# Patient Record
Sex: MALE | Race: WHITE | Employment: FULL TIME | URBAN - METROPOLITAN AREA
[De-identification: names, ages, dates, MRNs, and addresses within clinical notes are randomized per-mention and may not be internally consistent; named-entity substitution may affect disease eponyms.]

---

## 2018-06-06 RX ORDER — LOSARTAN POTASSIUM AND HYDROCHLOROTHIAZIDE 25; 100 MG/1; MG/1
TABLET ORAL
Refills: 0 | COMMUNITY
Start: 2018-04-12 | End: 2022-04-27 | Stop reason: ALTCHOICE

## 2018-06-06 RX ORDER — ERGOCALCIFEROL 1.25 MG/1
CAPSULE ORAL
Refills: 0 | COMMUNITY
Start: 2018-05-09 | End: 2021-11-10

## 2018-06-06 NOTE — PROGRESS NOTES
[facility] PRE-ADMISSION TESTING INSTRUCTIONS    The Preadmission Testing patient is instructed accordingly using the following criteria (check applicable):    ARRIVAL INSTRUCTIONS:  [x] Parking the day of Surgery is located in the Main Entrance lot. Upon entering the door, make an immediate right to the surgery reception desk    [x] Complimentary [address] is available Monday through Friday 6 am to 6 pm    [x] Bring photo ID and insurance card    [x] Bring in a copy of Living will or Durable Power of  papers. [x] Please be sure to arrange transportation to and from the hospital    [x] Please arrange for someone to be with you for the 24 hour period post procedure due to having anesthesia      GENERAL INSTRUCTIONS:    [x] Nothing by mouth after midnight, including gum, candy, mints or water    [x] You may brush your teeth, but do not swallow any water    [] Take medications as instructed with 1-2 oz of water    [] Stop herbal supplements and vitamins 5 days prior to procedure    [] Follow preop dosing of blood thinners per physician instructions    [] Do not take insulin or oral diabetic medications    [] If diabetic and have low blood sugar or feel symptomatic, take 1-2oz apple juice or glucose tablets    [] Bring inhalers day of surgery    [] Bring C-PAP/ Bi-Pap day of surgery    [] Bring urine specimen day of surgery    [] Shower or bath with soap, lather and rinse well, AM of Surgery, no lotion, powders or creams to surgical site    [x] Follow bowel prep as instructed per surgeon    [x] No tobacco products within 24 hours of surgery     [x] No alcohol or illegal drug use within 24 hours of surgery.     [x] Jewelry, body piercing's, eyeglasses, contact lenses and dentures are not permitted into surgery (bring cases)      [] Please do not wear any nail polish, make up or hair products on the day of surgery    [x] If not already done, you can expect a call from

## 2018-06-06 NOTE — PROGRESS NOTES
Patient requests that Dr Solitario Wright speaks ONLY to the patient post procedure, no information is to be given to family members.

## 2018-06-14 ENCOUNTER — HOSPITAL ENCOUNTER (OUTPATIENT)
Age: 53
Setting detail: OUTPATIENT SURGERY
Discharge: HOME | End: 2018-06-14
Attending: INTERNAL MEDICINE | Admitting: INTERNAL MEDICINE
Payer: COMMERCIAL

## 2018-06-14 ENCOUNTER — ANESTHESIA EVENT (OUTPATIENT)
Dept: ENDOSCOPY | Age: 53
End: 2018-06-14
Payer: COMMERCIAL

## 2018-06-14 ENCOUNTER — ANESTHESIA (OUTPATIENT)
Dept: ENDOSCOPY | Age: 53
End: 2018-06-14
Payer: COMMERCIAL

## 2018-06-14 VITALS
BODY MASS INDEX: 35.76 KG/M2 | HEIGHT: 72 IN | TEMPERATURE: 98 F | OXYGEN SATURATION: 99 % | SYSTOLIC BLOOD PRESSURE: 118 MMHG | DIASTOLIC BLOOD PRESSURE: 74 MMHG | HEART RATE: 58 BPM | RESPIRATION RATE: 16 BRPM | WEIGHT: 264 LBS

## 2018-06-14 VITALS — SYSTOLIC BLOOD PRESSURE: 105 MMHG | DIASTOLIC BLOOD PRESSURE: 61 MMHG | OXYGEN SATURATION: 99 %

## 2018-06-14 PROCEDURE — 7100000011 HC PHASE II RECOVERY - ADDTL 15 MIN: Performed by: INTERNAL MEDICINE

## 2018-06-14 PROCEDURE — 88305 TISSUE EXAM BY PATHOLOGIST: CPT

## 2018-06-14 PROCEDURE — 7100000010 HC PHASE II RECOVERY - FIRST 15 MIN: Performed by: INTERNAL MEDICINE

## 2018-06-14 PROCEDURE — 6360000002 HC RX W HCPCS: Performed by: NURSE ANESTHETIST, CERTIFIED REGISTERED

## 2018-06-14 PROCEDURE — C1773 RET DEV, INSERTABLE: HCPCS | Performed by: INTERNAL MEDICINE

## 2018-06-14 PROCEDURE — 3609010300 HC COLONOSCOPY W/BIOPSY SINGLE/MULTIPLE: Performed by: INTERNAL MEDICINE

## 2018-06-14 PROCEDURE — 3700000000 HC ANESTHESIA ATTENDED CARE: Performed by: INTERNAL MEDICINE

## 2018-06-14 PROCEDURE — 2580000003 HC RX 258: Performed by: NURSE ANESTHETIST, CERTIFIED REGISTERED

## 2018-06-14 PROCEDURE — 3700000001 HC ADD 15 MINUTES (ANESTHESIA): Performed by: INTERNAL MEDICINE

## 2018-06-14 RX ORDER — 0.9 % SODIUM CHLORIDE 0.9 %
10 VIAL (ML) INJECTION PRN
Status: CANCELLED | OUTPATIENT
Start: 2018-06-14

## 2018-06-14 RX ORDER — 0.9 % SODIUM CHLORIDE 0.9 %
10 VIAL (ML) INJECTION EVERY 12 HOURS SCHEDULED
Status: CANCELLED | OUTPATIENT
Start: 2018-06-14

## 2018-06-14 RX ORDER — PROPOFOL 10 MG/ML
INJECTION, EMULSION INTRAVENOUS PRN
Status: DISCONTINUED | OUTPATIENT
Start: 2018-06-14 | End: 2018-06-14 | Stop reason: SDUPTHER

## 2018-06-14 RX ORDER — SODIUM CHLORIDE 9 MG/ML
INJECTION, SOLUTION INTRAVENOUS CONTINUOUS PRN
Status: DISCONTINUED | OUTPATIENT
Start: 2018-06-14 | End: 2018-06-14 | Stop reason: SDUPTHER

## 2018-06-14 RX ADMIN — PROPOFOL 300 MG: 10 INJECTION, EMULSION INTRAVENOUS at 14:19

## 2018-06-14 RX ADMIN — SODIUM CHLORIDE: 9 INJECTION, SOLUTION INTRAVENOUS at 14:03

## 2018-06-14 ASSESSMENT — PAIN SCALES - GENERAL: PAINLEVEL_OUTOF10: 0

## 2020-07-13 LAB
VITAMIN D 25-HYDROXY: 55
VITAMIN D2, 25 HYDROXY: NORMAL
VITAMIN D3,25 HYDROXY: NORMAL

## 2021-11-10 RX ORDER — AMLODIPINE BESYLATE 5 MG/1
5 TABLET ORAL DAILY
COMMUNITY
End: 2022-05-31

## 2021-11-10 NOTE — PROGRESS NOTES
Have you been tested for COVID  No           Have you been told you were positive for COVID No  Have you had any known exposure to someone that is positive for COVID No  Do you have a cough                   No              Do you have shortness of breath No                 Do you have a sore throat            No                Are you having chills                    No                Are you having muscle aches. No                    Please come to the hospital wearing a mask and have your significant other wear a mask as well. Both of you should check your temperature before leaving to come here,  if it is 100 or higher please call [phone] for instruction. [facility] PRE-ADMISSION TESTING INSTRUCTIONS      ARRIVAL INSTRUCTIONS:  [x] Parking the day of Surgery is located in the Main Entrance lot. Upon entering the main door make an immediate right to the surgery reception desk. [x] Bring photo ID and insurance card    [x] Bring in a copy of Living will or Durable Power of  papers. [x] Please be sure to arrange for responsible adult to provide transportation to and from the hospital    [x] Please arrange for responsible adult to be with you for the 24 hour period post procedure due to having anesthesia      GENERAL INSTRUCTIONS:    [x] Nothing by mouth after midnight, including gum, candy, mints or water    [x] You may brush your teeth, but do not swallow any water    [x] Take medications as instructed with 1-2 oz of water    [x] Follow bowel prep as instructed per surgeon    [x] No tobacco products within 24 hours of surgery     [x] No alcohol or illegal drug use within 24 hours of surgery.     [x] Jewelry, body piercing's, eyeglasses, contact lenses and dentures are not permitted into surgery (bring cases)    [x] If you receive a survey after surgery we would greatly appreciate your comments

## 2021-11-11 ENCOUNTER — HOSPITAL ENCOUNTER (OUTPATIENT)
Age: 56
Setting detail: OUTPATIENT SURGERY
Discharge: HOME | End: 2021-11-11
Attending: INTERNAL MEDICINE | Admitting: INTERNAL MEDICINE
Payer: COMMERCIAL

## 2021-11-11 ENCOUNTER — ANESTHESIA EVENT (OUTPATIENT)
Dept: ENDOSCOPY | Age: 56
End: 2021-11-11
Payer: COMMERCIAL

## 2021-11-11 ENCOUNTER — ANESTHESIA (OUTPATIENT)
Dept: ENDOSCOPY | Age: 56
End: 2021-11-11
Payer: COMMERCIAL

## 2021-11-11 VITALS
HEART RATE: 56 BPM | TEMPERATURE: 97.3 F | OXYGEN SATURATION: 99 % | RESPIRATION RATE: 18 BRPM | HEIGHT: 72 IN | DIASTOLIC BLOOD PRESSURE: 79 MMHG | SYSTOLIC BLOOD PRESSURE: 130 MMHG | WEIGHT: 268 LBS | BODY MASS INDEX: 36.3 KG/M2

## 2021-11-11 VITALS
SYSTOLIC BLOOD PRESSURE: 133 MMHG | DIASTOLIC BLOOD PRESSURE: 74 MMHG | RESPIRATION RATE: 21 BRPM | OXYGEN SATURATION: 98 %

## 2021-11-11 PROCEDURE — 2709999900 HC NON-CHARGEABLE SUPPLY: Performed by: INTERNAL MEDICINE

## 2021-11-11 PROCEDURE — 3700000000 HC ANESTHESIA ATTENDED CARE: Performed by: INTERNAL MEDICINE

## 2021-11-11 PROCEDURE — 7100000010 HC PHASE II RECOVERY - FIRST 15 MIN: Performed by: INTERNAL MEDICINE

## 2021-11-11 PROCEDURE — 2580000003 HC RX 258: Performed by: NURSE ANESTHETIST, CERTIFIED REGISTERED

## 2021-11-11 PROCEDURE — 7100000011 HC PHASE II RECOVERY - ADDTL 15 MIN: Performed by: INTERNAL MEDICINE

## 2021-11-11 PROCEDURE — 6360000002 HC RX W HCPCS: Performed by: NURSE ANESTHETIST, CERTIFIED REGISTERED

## 2021-11-11 PROCEDURE — 3700000001 HC ADD 15 MINUTES (ANESTHESIA): Performed by: INTERNAL MEDICINE

## 2021-11-11 PROCEDURE — 3609010300 HC COLONOSCOPY W/BIOPSY SINGLE/MULTIPLE: Performed by: INTERNAL MEDICINE

## 2021-11-11 PROCEDURE — 88305 TISSUE EXAM BY PATHOLOGIST: CPT

## 2021-11-11 RX ORDER — PROPOFOL 10 MG/ML
INJECTION, EMULSION INTRAVENOUS PRN
Status: DISCONTINUED | OUTPATIENT
Start: 2021-11-11 | End: 2021-11-11 | Stop reason: SDUPTHER

## 2021-11-11 RX ORDER — SODIUM CHLORIDE 9 MG/ML
INJECTION, SOLUTION INTRAVENOUS CONTINUOUS PRN
Status: DISCONTINUED | OUTPATIENT
Start: 2021-11-11 | End: 2021-11-11 | Stop reason: SDUPTHER

## 2021-11-11 RX ORDER — SODIUM CHLORIDE 9 MG/ML
25 INJECTION, SOLUTION INTRAVENOUS PRN
Status: DISCONTINUED | OUTPATIENT
Start: 2021-11-11 | End: 2021-11-11 | Stop reason: HOSPADM

## 2021-11-11 RX ORDER — SODIUM CHLORIDE 0.9 % (FLUSH) 0.9 %
5-40 SYRINGE (ML) INJECTION PRN
Status: DISCONTINUED | OUTPATIENT
Start: 2021-11-11 | End: 2021-11-11 | Stop reason: HOSPADM

## 2021-11-11 RX ORDER — SODIUM CHLORIDE 0.9 % (FLUSH) 0.9 %
5-40 SYRINGE (ML) INJECTION EVERY 12 HOURS SCHEDULED
Status: DISCONTINUED | OUTPATIENT
Start: 2021-11-11 | End: 2021-11-11 | Stop reason: HOSPADM

## 2021-11-11 RX ADMIN — SODIUM CHLORIDE: 9 INJECTION, SOLUTION INTRAVENOUS at 14:10

## 2021-11-11 RX ADMIN — PROPOFOL 450 MG: 10 INJECTION, EMULSION INTRAVENOUS at 14:16

## 2021-11-11 ASSESSMENT — PAIN SCALES - GENERAL
PAINLEVEL_OUTOF10: 0
PAINLEVEL_OUTOF10: 0

## 2021-11-11 ASSESSMENT — PAIN DESCRIPTION - PAIN TYPE
TYPE: SURGICAL PAIN
TYPE: SURGICAL PAIN

## 2021-11-11 ASSESSMENT — LIFESTYLE VARIABLES: SMOKING_STATUS: 1

## 2021-11-11 ASSESSMENT — PAIN - FUNCTIONAL ASSESSMENT: PAIN_FUNCTIONAL_ASSESSMENT: 0-10

## 2021-11-11 NOTE — ANESTHESIA PRE PROCEDURE
Department of Anesthesiology  Preprocedure Note       Name:  Ira Calabrese. Age:  49 y.o.  :  1965                                          MRN:  [id number]         Date:  2021      Surgeon: Shai Valerio):  Rose Jacinto DO    Procedure: Procedure(s):  COLORECTAL CANCER SCREENING, NOT HIGH RISK    Medications prior to admission:   Prior to Admission medications    Medication Sig Start Date End Date Taking? Authorizing Provider   amLODIPine (NORVASC) 5 MG tablet Take 5 mg by mouth daily   Yes Historical Provider, MD   losartan-hydrochlorothiazide (HYZAAR) 100-25 MG per tablet TAKE 1 TABLET BY MOUTH EVERY DAY 18  Yes Historical Provider, MD       Current medications:    Current Facility-Administered Medications   Medication Dose Route Frequency Provider Last Rate Last Admin    sodium chloride flush 0.9 % injection 5-40 mL  5-40 mL IntraVENous 2 times per day Rose Jacinto DO        sodium chloride flush 0.9 % injection 5-40 mL  5-40 mL IntraVENous PRN Rose Jacinto DO        0.9 % sodium chloride infusion  25 mL IntraVENous PRN Rose Jacinto DO           Allergies: Allergies   Allergen Reactions    Demerol Hcl [Meperidine] Nausea And Vomiting       Problem List:  There is no problem list on file for this patient. Past Medical History:        Diagnosis Date    Encounter for screening colonoscopy     Hypertension     Vitamin D deficiency        Past Surgical History:        Procedure Laterality Date    COLONOSCOPY N/A 2018    COLONOSCOPY WITH BIOPSY performed by Rose Jacinto DO at [facility] years ago.        Social History:    Social History     Tobacco Use    Smoking status: Light Tobacco Smoker    Smokeless tobacco: Never Used    Tobacco comment: occasional cigar   Substance Use Topics    Alcohol use: Yes     Comment: occasional                                Ready to quit: Not Answered  Counseling given: Not Answered  Comment: occasional cigar      Vital Signs (Current):   Vitals:    11/10/21 0925 11/11/21 1353   BP:  (!) 159/79   Pulse:  78   Resp:  18   Temp:  98 °F (36.7 °C)   TempSrc:  Temporal   SpO2:  97%   Weight: 268 lb (121.6 kg) 268 lb (121.6 kg)   Height: 6' (1.829 m) 6' (1.829 m)                                              BP Readings from Last 3 Encounters:   11/11/21 (!) 159/79   06/14/18 118/74   06/14/18 105/61       NPO Status: Time of last liquid consumption: 2200                        Time of last solid consumption: 1130                        Date of last liquid consumption: 11/10/21                        Date of last solid food consumption: 11/10/21    BMI:   Wt Readings from Last 3 Encounters:   11/11/21 268 lb (121.6 kg)   06/06/18 264 lb (119.7 kg)     Body mass index is 36.35 kg/m². CBC:   Lab Results   Component Value Date    WBC 6.2 09/07/2021    RBC 4.72 09/07/2021    HGB 13.5 09/07/2021    HCT 42.8 09/07/2021    MCV 90.7 09/07/2021    RDW 14.5 09/07/2021     09/07/2021       CMP:   Lab Results   Component Value Date     09/07/2021    K 4.5 09/07/2021     09/07/2021    CO2 26 09/07/2021    BUN 15 09/07/2021    CREATININE 0.8 09/07/2021    GFRAA >60 09/07/2021    LABGLOM >60 09/07/2021    GLUCOSE 77 09/07/2021    GLUCOSE 94 10/11/2011    PROT 7.4 09/07/2021    CALCIUM 9.7 09/07/2021    BILITOT 0.4 09/07/2021    ALKPHOS 74 09/07/2021    AST 32 09/07/2021    ALT 20 09/07/2021       POC Tests: No results for input(s): POCGLU, POCNA, POCK, POCCL, POCBUN, POCHEMO, POCHCT in the last 72 hours. Coags: No results found for: PROTIME, INR, APTT    HCG (If Applicable): No results found for: PREGTESTUR, PREGSERUM, HCG, HCGQUANT     ABGs: No results found for: PHART, PO2ART, VQP3GZX, EVP4WQG, BEART, O6ZDRKNP     Type & Screen (If Applicable):  No results found for: LABABO, LABRH    Drug/Infectious Status (If Applicable):  No results found for: HIV, HEPCAB    COVID-19 Screening (If Applicable):  No results found for: [address] [facility]        Anesthesia Evaluation  Patient summary reviewed no history of anesthetic complications:   Airway: Mallampati: III  TM distance: >3 FB   Neck ROM: full  Mouth opening: > = 3 FB Dental:          Pulmonary: breath sounds clear to auscultation  (+) current smoker          Patient did not smoke on day of surgery. Cardiovascular:    (+) hypertension: moderate,     (-) past MI, CAD,  CHF and orthopnea      Rhythm: regular  Rate: normal                    Neuro/Psych:   Negative Neuro/Psych ROS              GI/Hepatic/Renal:   (+) bowel prep, morbid obesity     (-) liver disease and no renal disease       Endo/Other:        (-) diabetes mellitus, hypothyroidism               Abdominal:   (+) obese,           Vascular: negative vascular ROS. Other Findings:             Anesthesia Plan      MAC     ASA 2       Induction: intravenous. MIPS: Prophylactic antiemetics administered. Anesthetic plan and risks discussed with patient. Plan discussed with CRNA.                 Charlee Sun MD   11/11/2021

## 2021-11-11 NOTE — OP NOTE
Operative Note      Patient: Marita Amin. YOB: 1965  MRN: [id number]    Date of Procedure: 11/11/2021    Pre-Op Diagnosis: SCREENING, FmHx CRC Father and Both Grandfathers    Post-Op Diagnosis: SAME       Procedure(s):  COLORECTAL CANCER SCREENING, NOT HIGH RISK    Surgeon(s):  Daphne Pena DO    Assistant:   * No surgical staff found *    Anesthesia: Monitor Anesthesia Care    Estimated Blood Loss (mL): <2 cc    Complications: None    Specimens:   * No specimens in log *    Implants:  * No implants in log *      Drains: * No LDAs found *    Detailed Description of Procedure:   Colonoscopy note    Indication: screening colonoscopy, H/O colon polyps, FmHx CRC Dad and Both Grandfather's    Consent: Informed consent was obtained from the patient including and not limited to risk of perforation, bleeding, infection, dental breakage, ileus, need for surgery, or worst case death. Sedation  MAC    Estimated Blood Loss -- < 2cc    Endoscope was advanced through anus to cecum and terminal ileum. The ileocecal valve and appendiceal orifice were identified and pictures were taken. Preparation is good. Patient tolerated procedure well. Terminal ileum x5 cm is normal  Cecum is normal  Ascending colon is normal   Transverse colon with mild scattered diverticula, 3mm polyp removed by forceps  Descending colon is normal other than mild scattered diverticula  Sigmoid colon with a 3 mm polyp removed by forceps. Moderate diverticulosis throughout sigmoid. Rectum direct views are normal  Retroflexion in rectum shows normal mucosa and dentate line with grade 1-2 IH's    IMPRESSION AND PLAN:   1. Colon polyps x2 removed by forceps     2. Diverticulosis moderate in sigmoid and scattered to transverse colon     3. Grade 1-2 IH's       4.   Follow up as outpatient in office, call [phone] to schedule for appointment.       Repeat screening colonoscopy in 3 yrs, earlier if alarm symptoms (pain, bleeding, weight loss, diarrhea or sudden onset constipation) occur.       Hiram Kirby D.O.  11/11/21  1442      Electronically signed by Harsh Malone DO on 11/11/2021 at 2:08 PM

## 2021-11-11 NOTE — PROGRESS NOTES
Immediately prior to the procedure the patient's History and Physical was reviewed- there are no changes with the current vitals. BP (!) 159/79   Pulse 78   Temp 98 °F (36.7 °C) (Temporal)   Resp 18   Ht 6' (1.829 m)   Wt 268 lb (121.6 kg)   SpO2 97%   BMI 36.35 kg/m²     No CP/SOB. Risks/benefits d/w pt. All questions answered. Proceed with Colonoscopy.     Srikanth Natarajan DO  11/11/2021  2:08 PM

## 2021-11-11 NOTE — H&P
[facility]    [facility]  Dr. Edmund Garcia M.D., Dr. Jennifer Al M.D., Rose Jacinto D.O.,   Carlos Hernandez M.D., Rafael Luna D.O., and Dr. Juany Fu M.D. Date:11:36 AM 11/11/2021    Ira Calabrese.  49 y.o.  male    HPI:  Pt for high risk screening colonoscopy    PmHx -- HTN, Colon Polyps, Diverticulosis    PsHx -- Colonoscopies    All -- NKDA    Meds -- Losartan/HCTZ, Vit D, Norvasc    FmHx -- FmHx CRC Father and Both Grandfathers, Grandma Bone    SocHx -- Rare EtOH, NO smoking or illicit substances    ROS -- Negative other than HPI. PE:  Ht 6' (1.829 m)   Wt 268 lb (121.6 kg)   BMI 36.35 kg/m²     BP (!) 159/79   Pulse 78   Temp 98 °F (36.7 °C) (Temporal)   Resp 18   Ht 6' (1.829 m)   Wt 268 lb (121.6 kg)   SpO2 97%   BMI 36.35 kg/m²  (updated vitals from day of procedure)    No icterus  RRR  CTA no Wheeze  Soft, NT, ND, BS +  No edema or rash    ASSESSMENT/PLAN:  1. High Risk Screening Colonoscopy    2. FmHx CRC Father and Both Grandfathers    3.   H/O Colon Polyp      AckermanDO  11/11/2021  11:36 AM

## 2022-04-27 RX ORDER — VALSARTAN AND HYDROCHLOROTHIAZIDE 160; 25 MG/1; MG/1
1 TABLET ORAL DAILY
COMMUNITY
End: 2022-05-31 | Stop reason: SDUPTHER

## 2022-05-31 ENCOUNTER — OFFICE VISIT (OUTPATIENT)
Dept: PRIMARY CARE CLINIC | Age: 57
End: 2022-05-31
Payer: COMMERCIAL

## 2022-05-31 VITALS
HEIGHT: 72 IN | WEIGHT: 253 LBS | OXYGEN SATURATION: 98 % | TEMPERATURE: 98.1 F | BODY MASS INDEX: 34.27 KG/M2 | HEART RATE: 72 BPM | DIASTOLIC BLOOD PRESSURE: 78 MMHG | SYSTOLIC BLOOD PRESSURE: 120 MMHG

## 2022-05-31 DIAGNOSIS — I10 PRIMARY HYPERTENSION: ICD-10-CM

## 2022-05-31 DIAGNOSIS — Z12.5 PROSTATE CANCER SCREENING: ICD-10-CM

## 2022-05-31 DIAGNOSIS — E55.9 VITAMIN D DEFICIENCY: ICD-10-CM

## 2022-05-31 DIAGNOSIS — M99.03 SOMATIC DYSFUNCTION OF SPINE, LUMBAR: ICD-10-CM

## 2022-05-31 DIAGNOSIS — E78.5 DYSLIPIDEMIA: ICD-10-CM

## 2022-05-31 DIAGNOSIS — Z00.00 ENCOUNTER FOR WELLNESS EXAMINATION IN ADULT: Primary | ICD-10-CM

## 2022-05-31 PROBLEM — Z86.79 HISTORY OF VENTRICULAR TACHYCARDIA: Status: ACTIVE | Noted: 2022-05-31

## 2022-05-31 PROBLEM — J30.2 SEASONAL ALLERGIC RHINITIS: Status: ACTIVE | Noted: 2022-05-31

## 2022-05-31 PROBLEM — Z86.79 HISTORY OF PERICARDITIS: Status: ACTIVE | Noted: 2022-05-31

## 2022-05-31 LAB
HCT VFR BLD CALC: 43.4 % (ref 37–54)
HEMOGLOBIN: 13.5 G/DL (ref 12.5–16.5)
MCH RBC QN AUTO: 28.2 PG (ref 26–35)
MCHC RBC AUTO-ENTMCNC: 31.1 % (ref 32–34.5)
MCV RBC AUTO: 90.6 FL (ref 80–99.9)
PDW BLD-RTO: 15 FL (ref 11.5–15)
PLATELET # BLD: 224 E9/L (ref 130–450)
PMV BLD AUTO: 12.1 FL (ref 7–12)
RBC # BLD: 4.79 E12/L (ref 3.8–5.8)
WBC # BLD: 7.3 E9/L (ref 4.5–11.5)

## 2022-05-31 PROCEDURE — 99396 PREV VISIT EST AGE 40-64: CPT | Performed by: FAMILY MEDICINE

## 2022-05-31 RX ORDER — VALSARTAN AND HYDROCHLOROTHIAZIDE 160; 25 MG/1; MG/1
1 TABLET ORAL DAILY
Qty: 90 TABLET | Refills: 1 | Status: SHIPPED
Start: 2022-05-31 | End: 2022-09-06 | Stop reason: SDUPTHER

## 2022-05-31 RX ORDER — AMLODIPINE BESYLATE 5 MG/1
5 TABLET ORAL DAILY
Qty: 90 TABLET | Refills: 0 | OUTPATIENT
Start: 2022-05-31

## 2022-05-31 RX ORDER — AMLODIPINE BESYLATE 5 MG/1
5 TABLET ORAL DAILY
COMMUNITY
End: 2022-05-31

## 2022-05-31 SDOH — ECONOMIC STABILITY: FOOD INSECURITY: WITHIN THE PAST 12 MONTHS, THE FOOD YOU BOUGHT JUST DIDN'T LAST AND YOU DIDN'T HAVE MONEY TO GET MORE.: NEVER TRUE

## 2022-05-31 SDOH — ECONOMIC STABILITY: FOOD INSECURITY: WITHIN THE PAST 12 MONTHS, YOU WORRIED THAT YOUR FOOD WOULD RUN OUT BEFORE YOU GOT MONEY TO BUY MORE.: NEVER TRUE

## 2022-05-31 ASSESSMENT — PATIENT HEALTH QUESTIONNAIRE - PHQ9
1. LITTLE INTEREST OR PLEASURE IN DOING THINGS: 0
SUM OF ALL RESPONSES TO PHQ9 QUESTIONS 1 & 2: 0
SUM OF ALL RESPONSES TO PHQ QUESTIONS 1-9: 0
2. FEELING DOWN, DEPRESSED OR HOPELESS: 0
SUM OF ALL RESPONSES TO PHQ QUESTIONS 1-9: 0

## 2022-05-31 ASSESSMENT — SOCIAL DETERMINANTS OF HEALTH (SDOH): HOW HARD IS IT FOR YOU TO PAY FOR THE VERY BASICS LIKE FOOD, HOUSING, MEDICAL CARE, AND HEATING?: NOT HARD AT ALL

## 2022-05-31 NOTE — PATIENT INSTRUCTIONS
Patient Education        Low Back Pain: Exercises  Introduction  Here are some examples of exercises for you to try. The exercises may be suggested for a condition or for rehabilitation. Start each exercise slowly. Ease off the exercises if you start to have pain. You will be told when to start these exercises and which ones will work bestfor you. How to do the exercises  Press-up    1. Lie on your stomach, supporting your body with your forearms. 2. Press your elbows down into the floor to raise your upper back. As you do this, relax your stomach muscles and allow your back to arch without using your back muscles. As your press up, do not let your hips or pelvis come off the floor. 3. Hold for 15 to 30 seconds, then relax. 4. Repeat 2 to 4 times. Alternate arm and leg (bird dog) exercise    Do this exercise slowly. Try to keep your body straight at all times, and donot let one hip drop lower than the other. 1. Start on the floor, on your hands and knees. 2. Tighten your belly muscles. 3. Raise one leg off the floor, and hold it straight out behind you. Be careful not to let your hip drop down, because that will twist your trunk. 4. Hold for about 6 seconds, then lower your leg and switch to the other leg. 5. Repeat 8 to 12 times on each leg. 6. Over time, work up to holding for 10 to 30 seconds each time. 7. If you feel stable and secure with your leg raised, try raising the opposite arm straight out in front of you at the same time. Knee-to-chest exercise    1. Lie on your back with your knees bent and your feet flat on the floor. 2. Bring one knee to your chest, keeping the other foot flat on the floor (or keeping the other leg straight, whichever feels better on your lower back). 3. Keep your lower back pressed to the floor. Hold for at least 15 to 30 seconds. 4. Relax, and lower the knee to the starting position. 5. Repeat with the other leg. Repeat 2 to 4 times with each leg.   6. To get more stretch, put your other leg flat on the floor while pulling your knee to your chest.  Curl-ups    1. Lie on the floor on your back with your knees bent at a 90-degree angle. Your feet should be flat on the floor, about 12 inches from your buttocks. 2. Cross your arms over your chest. If this bothers your neck, try putting your hands behind your neck (not your head), with your elbows spread apart. 3. Slowly tighten your belly muscles and raise your shoulder blades off the floor. 4. Keep your head in line with your body, and do not press your chin to your chest.  5. Hold this position for 1 or 2 seconds, then slowly lower yourself back down to the floor. 6. Repeat 8 to 12 times. Pelvic tilt exercise    1. Lie on your back with your knees bent. 2. \"Brace\" your stomach. This means to tighten your muscles by pulling in and imagining your belly button moving toward your spine. You should feel like your back is pressing to the floor and your hips and pelvis are rocking back. 3. Hold for about 6 seconds while you breathe smoothly. 4. Repeat 8 to 12 times. Heel dig bridging    1. Lie on your back with both knees bent and your ankles bent so that only your heels are digging into the floor. Your knees should be bent about 90 degrees. 2. Then push your heels into the floor, squeeze your buttocks, and lift your hips off the floor until your shoulders, hips, and knees are all in a straight line. 3. Hold for about 6 seconds as you continue to breathe normally, and then slowly lower your hips back down to the floor and rest for up to 10 seconds. 4. Do 8 to 12 repetitions. Hamstring stretch in doorway    1. Lie on your back in a doorway, with one leg through the open door. 2. Slide your leg up the wall to straighten your knee. You should feel a gentle stretch down the back of your leg. 3. Hold the stretch for at least 15 to 30 seconds. Do not arch your back, point your toes, or bend either knee.  Keep one heel touching the floor and the other heel touching the wall. 4. Repeat with your other leg. 5. Do 2 to 4 times for each leg. Hip flexor stretch    1. Kneel on the floor with one knee bent and one leg behind you. Place your forward knee over your foot. Keep your other knee touching the floor. 2. Slowly push your hips forward until you feel a stretch in the upper thigh of your rear leg. 3. Hold the stretch for at least 15 to 30 seconds. Repeat with your other leg. 4. Do 2 to 4 times on each side. Wall sit    1. Stand with your back 10 to 12 inches away from a wall. 2. Lean into the wall until your back is flat against it. 3. Slowly slide down until your knees are slightly bent, pressing your lower back into the wall. 4. Hold for about 6 seconds, then slide back up the wall. 5. Repeat 8 to 12 times. Follow-up care is a key part of your treatment and safety. Be sure to make and go to all appointments, and call your doctor if you are having problems. It's also a good idea to know your test results and keep alist of the medicines you take. Where can you learn more? Go to https://[employer]. org and sign in to your [employer] account. Enter K479 in the [facility] box to learn more about \"Low Back Pain: Exercises. \"     If you do not have an account, please click on the \"Sign Up Now\" link. Current as of: July 1, 2021               Content Version: 13.2  © 2006-2022 [employer]. Care instructions adapted under license by [facility] ([facility]). If you have questions about a medical condition or this instruction, always ask your healthcare professional. [place] [address] any warranty or liability for your use of this information.

## 2022-05-31 NOTE — PROGRESS NOTES
Delilah Cortés (:  1965) is a 49 y.o. male,Established patient, here for evaluation of the following chief complaint(s):  6 Month Follow-Up (Labs)         ASSESSMENT/PLAN:  1. Encounter for wellness examination in adult  2. Primary hypertension  -     CBC; Future  -     Comprehensive Metabolic Panel, Fasting; Future  3. Vitamin D deficiency  -     CBC; Future  -     Comprehensive Metabolic Panel, Fasting; Future  4. Dyslipidemia  -     CBC; Future  -     Comprehensive Metabolic Panel, Fasting; Future  -     Lipid Panel; Future  5. Prostate cancer screening  -     PSA Screening; Future  6. Somatic dysfunction of spine, lumbar      PLAN:   Going to [place], [place] with the family on vacation.  Staying at the [place], [place] with wife.  DC Amlodipine per patient request.   Monitor BP at home every 2-4 weeks.  Advised to continue diet and exercise program, moderate intensity, minimum 150 minutes weekly.  Colonoscopy report 2021 reviewed with patient in detail.  Issued and demonstrated low back stretching exercises to be performed on a daily basis. Return in about 6 months (around 2022) for Labs, Follow up. Subjective   SUBJECTIVE/OBJECTIVE:  Patient is here for Adult Annual Wellness Visit. Patient offers no complaints at the present time. Is been watching his diet and exercising. He has lost weight. He is down 18 pounds from last visit. Overall he feels much better. Blood pressure has been under much better control. Ask if he can discontinue one of his blood pressure medications. Review of Systems   Constitutional: Negative for chills, fatigue and fever. HENT: Negative for congestion, ear discharge, ear pain, facial swelling, hearing loss, nosebleeds, rhinorrhea, sinus pressure and sore throat. Eyes: Negative for photophobia, pain, discharge, itching and visual disturbance. Respiratory: Negative for cough, shortness of breath and wheezing. Cardiovascular: Negative for chest pain, palpitations and leg swelling. Gastrointestinal: Negative for abdominal distention, abdominal pain, blood in stool, constipation, diarrhea, nausea and vomiting. Endocrine: Negative for polydipsia, polyphagia and polyuria. Genitourinary: Negative for difficulty urinating, dysuria, frequency, hematuria and urgency. Musculoskeletal: Positive for back pain (Chronic). Negative for arthralgias, joint swelling and myalgias. Skin: Negative for color change and rash. Allergic/Immunologic: Negative for environmental allergies and food allergies. Neurological: Negative for dizziness, seizures, syncope, weakness, numbness and headaches. Psychiatric/Behavioral: Negative for confusion, hallucinations and suicidal ideas. The patient is not nervous/anxious. Immunization History   Administered Date(s) Administered    COVID-19, Pfizer Purple top, DILUTE for use, 12+ yrs, 30mcg/0.3mL dose 02/26/2021, 03/19/2021, 10/03/2021    Hepatitis B Adult (Engerix-B) 04/06/1995, 05/18/1995, 11/15/1995    Influenza Virus Vaccine 09/14/2021    Influenza, MDCK Quadv, IM, PF (Flucelvax 2 yrs and older) 09/27/2019, 09/28/2020    Td vaccine (adult) 05/17/2013    Tdap (Boostrix, Adacel) 05/07/2013    Zoster Recombinant (Shingrix) 09/28/2020, 12/18/2020        Objective   /78   Pulse 72   Temp 98.1 °F (36.7 °C) (Temporal)   Ht 6' (1.829 m)   Wt 253 lb (114.8 kg)   SpO2 98%   BMI 34.31 kg/m²   Current Outpatient Medications   Medication Sig Dispense Refill    valsartan-hydroCHLOROthiazide (DIOVAN-HCT) 160-25 MG per tablet Take 1 tablet by mouth daily 90 tablet 1     No current facility-administered medications for this visit. Physical Exam  Vitals and nursing note reviewed. Constitutional:       General: He is not in acute distress. Appearance: Normal appearance. HENT:      Head: Normocephalic and atraumatic.       Right Ear: Tympanic membrane, ear canal and external ear normal. There is no impacted cerumen. Left Ear: Tympanic membrane, ear canal and external ear normal. There is no impacted cerumen. Nose: Nose normal.      Mouth/Throat:      Mouth: Mucous membranes are moist.      Pharynx: No oropharyngeal exudate or posterior oropharyngeal erythema. Eyes:      General: No scleral icterus. Extraocular Movements: Extraocular movements intact. Conjunctiva/sclera: Conjunctivae normal.      Pupils: Pupils are equal, round, and reactive to light. Neck:      Vascular: No carotid bruit. Comments: Trachea midline. No JVD. No thyromegaly or nodules. Cardiovascular:      Rate and Rhythm: Normal rate and regular rhythm. Pulses: Normal pulses. Heart sounds: Normal heart sounds. No murmur heard. Pulmonary:      Effort: Pulmonary effort is normal. No respiratory distress. Breath sounds: Normal breath sounds. No wheezing, rhonchi or rales. Abdominal:      General: Bowel sounds are normal. There is no distension. Palpations: Abdomen is soft. There is no mass. Tenderness: There is no abdominal tenderness. There is no guarding or rebound. Genitourinary:     Comments: Not examined. Musculoskeletal:         General: No swelling, tenderness or deformity. Cervical back: Normal range of motion. Lumbar back: Spasms (Bilateral lumbar paravertebral musculature) present. No swelling, deformity, signs of trauma or lacerations. Decreased range of motion. Negative right straight leg raise test and negative left straight leg raise test. No scoliosis. Right lower leg: No edema. Left lower leg: No edema. Comments: Hamstrings are tight bilaterally. Lymphadenopathy:      Cervical: No cervical adenopathy. Skin:     General: Skin is warm and dry. Neurological:      General: No focal deficit present. Mental Status: He is alert and oriented to person, place, and time.       Cranial Nerves: No cranial nerve deficit. Psychiatric:         Mood and Affect: Mood normal.         Behavior: Behavior normal.         Thought Content: Thought content normal.         Judgment: Judgment normal.            CBC  WBC   Date Value Ref Range Status   05/31/2022 7.3 4.5 - 11.5 E9/L Final     RBC   Date Value Ref Range Status   05/31/2022 4.79 3.80 - 5.80 E12/L Final     Hemoglobin   Date Value Ref Range Status   05/31/2022 13.5 12.5 - 16.5 g/dL Final     Hematocrit   Date Value Ref Range Status   05/31/2022 43.4 37.0 - 54.0 % Final     MCV   Date Value Ref Range Status   05/31/2022 90.6 80.0 - 99.9 fL Final     MCH   Date Value Ref Range Status   05/31/2022 28.2 26.0 - 35.0 pg Final     MCHC   Date Value Ref Range Status   05/31/2022 31.1 (L) 32.0 - 34.5 % Final     RDW   Date Value Ref Range Status   05/31/2022 15.0 11.5 - 15.0 fL Final     Platelets   Date Value Ref Range Status   05/31/2022 224 130 - 450 E9/L Final     MPV   Date Value Ref Range Status   05/31/2022 12.1 (H) 7.0 - 12.0 fL Final       CMP  Sodium   Date Value Ref Range Status   05/31/2022 142 132 - 146 mmol/L Final     Potassium   Date Value Ref Range Status   05/31/2022 4.9 3.5 - 5.0 mmol/L Final     Chloride   Date Value Ref Range Status   05/31/2022 104 98 - 107 mmol/L Final     CO2   Date Value Ref Range Status   05/31/2022 22 22 - 29 mmol/L Final     Anion Gap   Date Value Ref Range Status   05/31/2022 16 7 - 16 mmol/L Final     Glucose   Date Value Ref Range Status   09/07/2021 77 74 - 99 mg/dL Final   10/11/2011 94 70 - 110 mg/dL Final     BUN   Date Value Ref Range Status   05/31/2022 13 6 - 20 mg/dL Final     CREATININE   Date Value Ref Range Status   05/31/2022 0.9 0.7 - 1.2 mg/dL Final     GFR Non-   Date Value Ref Range Status   05/31/2022 >60 >=60 mL/min/1.73 Final     Comment:     Chronic Kidney Disease: less than 60 ml/min/1.73 sq.m. Kidney Failure: less than 15 ml/min/1.73 sq.m.   Results valid for patients 18 years and older.       GFR    Date Value Ref Range Status   05/31/2022 >60  Final     Calcium   Date Value Ref Range Status   05/31/2022 9.7 8.6 - 10.2 mg/dL Final     Total Protein   Date Value Ref Range Status   05/31/2022 7.7 6.4 - 8.3 g/dL Final     Albumin   Date Value Ref Range Status   05/31/2022 4.4 3.5 - 5.2 g/dL Final   10/11/2011 4.4 3.2 - 4.8 g/dL Final     Total Bilirubin   Date Value Ref Range Status   05/31/2022 0.6 0.0 - 1.2 mg/dL Final     Alkaline Phosphatase   Date Value Ref Range Status   05/31/2022 78 40 - 129 U/L Final     ALT   Date Value Ref Range Status   05/31/2022 18 0 - 40 U/L Final     AST   Date Value Ref Range Status   05/31/2022 32 0 - 39 U/L Final       TSH  Lab Results   Component Value Date    TSH 2.670 09/07/2021       A1C  No results found for: LABA1C    LIPID  Lab Results   Component Value Date    CHOL 176 05/31/2022    TRIG 62 05/31/2022    HDL 47 05/31/2022    LDLCALC 117 (H) 05/31/2022    LABVLDL 12 05/31/2022        PSA  Lab Results   Component Value Date    PSA 1.88 05/31/2022       No results found for this visit on 05/31/22. An electronic signature was used to authenticate this note.     --Luis Stanley DO

## 2022-06-01 PROBLEM — M99.03 SOMATIC DYSFUNCTION OF SPINE, LUMBAR: Status: ACTIVE | Noted: 2022-06-01

## 2022-06-01 LAB
ALBUMIN SERPL-MCNC: 4.4 G/DL (ref 3.5–5.2)
ALP BLD-CCNC: 78 U/L (ref 40–129)
ALT SERPL-CCNC: 18 U/L (ref 0–40)
ANION GAP SERPL CALCULATED.3IONS-SCNC: 16 MMOL/L (ref 7–16)
AST SERPL-CCNC: 32 U/L (ref 0–39)
BILIRUB SERPL-MCNC: 0.6 MG/DL (ref 0–1.2)
BUN BLDV-MCNC: 13 MG/DL (ref 6–20)
CALCIUM SERPL-MCNC: 9.7 MG/DL (ref 8.6–10.2)
CHLORIDE BLD-SCNC: 104 MMOL/L (ref 98–107)
CHOLESTEROL, TOTAL: 176 MG/DL (ref 0–199)
CO2: 22 MMOL/L (ref 22–29)
CREAT SERPL-MCNC: 0.9 MG/DL (ref 0.7–1.2)
GFR AFRICAN AMERICAN: >60
GFR NON-AFRICAN AMERICAN: >60 ML/MIN/1.73
GLUCOSE FASTING: 92 MG/DL (ref 74–99)
HDLC SERPL-MCNC: 47 MG/DL
LDL CHOLESTEROL CALCULATED: 117 MG/DL (ref 0–99)
POTASSIUM SERPL-SCNC: 4.9 MMOL/L (ref 3.5–5)
PROSTATE SPECIFIC ANTIGEN: 1.88 NG/ML (ref 0–4)
SODIUM BLD-SCNC: 142 MMOL/L (ref 132–146)
TOTAL PROTEIN: 7.7 G/DL (ref 6.4–8.3)
TRIGL SERPL-MCNC: 62 MG/DL (ref 0–149)
VLDLC SERPL CALC-MCNC: 12 MG/DL

## 2022-06-01 ASSESSMENT — ENCOUNTER SYMPTOMS
NAUSEA: 0
COUGH: 0
DIARRHEA: 0
BACK PAIN: 1
WHEEZING: 0
COLOR CHANGE: 0
EYE DISCHARGE: 0
VOMITING: 0
CONSTIPATION: 0
ABDOMINAL DISTENTION: 0
ABDOMINAL PAIN: 0
EYE PAIN: 0
EYE ITCHING: 0
BLOOD IN STOOL: 0
SORE THROAT: 0
SINUS PRESSURE: 0
RHINORRHEA: 0
SHORTNESS OF BREATH: 0
PHOTOPHOBIA: 0
FACIAL SWELLING: 0

## 2022-09-06 RX ORDER — VALSARTAN AND HYDROCHLOROTHIAZIDE 160; 25 MG/1; MG/1
1 TABLET ORAL DAILY
Qty: 90 TABLET | Refills: 1 | Status: SHIPPED | OUTPATIENT
Start: 2022-09-06

## 2022-09-06 NOTE — TELEPHONE ENCOUNTER
Valsartan HCTZ 160/25  #90  Si qd      [facility] change and needs to use this pharmacy for maintenance Rx

## 2022-12-05 ENCOUNTER — TELEPHONE (OUTPATIENT)
Dept: PRIMARY CARE CLINIC | Age: 57
End: 2022-12-05

## 2022-12-05 NOTE — TELEPHONE ENCOUNTER
----- Message from [facility] sent at 12/5/2022 10:12 AM EST -----  Subject: Appointment Request    Reason for Call: Established Patient Appointment needed: Routine Existing   Condition Follow Up    QUESTIONS    Reason for appointment request? No appointments available during search     Additional Information for Provider? 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 He   is the . The appointment was for his 6 mo follow. He needs his meds renewed. The first available appt was 3/28/2023. Patient   needs to be seen sooner.  Please call the patient to advise.   ---------------------------------------------------------------------------  --------------  Izabella LYN  [phone]; OK to leave message on voicemail  ---------------------------------------------------------------------------  --------------  SCRIPT ANSWERS  COVID Screen: David De La Rosa

## 2022-12-06 ENCOUNTER — OFFICE VISIT (OUTPATIENT)
Dept: PRIMARY CARE CLINIC | Age: 57
End: 2022-12-06
Payer: COMMERCIAL

## 2022-12-06 VITALS
SYSTOLIC BLOOD PRESSURE: 100 MMHG | TEMPERATURE: 97.8 F | DIASTOLIC BLOOD PRESSURE: 62 MMHG | HEIGHT: 72 IN | BODY MASS INDEX: 34.4 KG/M2 | WEIGHT: 254 LBS | HEART RATE: 75 BPM | OXYGEN SATURATION: 99 %

## 2022-12-06 DIAGNOSIS — E55.9 VITAMIN D DEFICIENCY: ICD-10-CM

## 2022-12-06 DIAGNOSIS — E78.5 DYSLIPIDEMIA: ICD-10-CM

## 2022-12-06 DIAGNOSIS — I10 PRIMARY HYPERTENSION: ICD-10-CM

## 2022-12-06 DIAGNOSIS — I10 PRIMARY HYPERTENSION: Primary | ICD-10-CM

## 2022-12-06 LAB
HCT VFR BLD CALC: 44.7 % (ref 37–54)
HEMOGLOBIN: 14.2 G/DL (ref 12.5–16.5)
MCH RBC QN AUTO: 29.1 PG (ref 26–35)
MCHC RBC AUTO-ENTMCNC: 31.8 % (ref 32–34.5)
MCV RBC AUTO: 91.6 FL (ref 80–99.9)
PDW BLD-RTO: 14.1 FL (ref 11.5–15)
PLATELET # BLD: 212 E9/L (ref 130–450)
PMV BLD AUTO: 11.8 FL (ref 7–12)
RBC # BLD: 4.88 E12/L (ref 3.8–5.8)
WBC # BLD: 5.7 E9/L (ref 4.5–11.5)

## 2022-12-06 PROCEDURE — 3074F SYST BP LT 130 MM HG: CPT | Performed by: FAMILY MEDICINE

## 2022-12-06 PROCEDURE — 3078F DIAST BP <80 MM HG: CPT | Performed by: FAMILY MEDICINE

## 2022-12-06 PROCEDURE — 99213 OFFICE O/P EST LOW 20 MIN: CPT | Performed by: FAMILY MEDICINE

## 2022-12-06 RX ORDER — VALSARTAN AND HYDROCHLOROTHIAZIDE 160; 25 MG/1; MG/1
1 TABLET ORAL DAILY
Qty: 90 TABLET | Refills: 1 | Status: SHIPPED | OUTPATIENT
Start: 2022-12-06

## 2022-12-06 ASSESSMENT — ENCOUNTER SYMPTOMS
SINUS PRESSURE: 0
DIARRHEA: 0
FACIAL SWELLING: 0
COLOR CHANGE: 0
BACK PAIN: 0
PHOTOPHOBIA: 0
ABDOMINAL PAIN: 0
WHEEZING: 0
BLOOD IN STOOL: 0
SORE THROAT: 0
EYE DISCHARGE: 0
NAUSEA: 0
ABDOMINAL DISTENTION: 0
VOMITING: 0
COUGH: 0
RHINORRHEA: 0
EYE PAIN: 0
SHORTNESS OF BREATH: 0
CONSTIPATION: 0
EYE ITCHING: 0

## 2022-12-06 NOTE — PROGRESS NOTES
David Westbrook. (:  1965) is a 56 y.o. male,Established patient, here for evaluation of the following chief complaint(s):  6 Month Follow-Up (Labs Pt overall feels well overall and he offers no complaints)         ASSESSMENT/PLAN:  1. Primary hypertension  -     CBC; Future  -     Comprehensive Metabolic Panel, Fasting; Future  -     Lipid Panel; Future  -     TSH; Future  2. Vitamin D deficiency  -     Vitamin D 25 Hydroxy; Future  3. Dyslipidemia  -     CBC; Future  -     Comprehensive Metabolic Panel, Fasting; Future  -     Lipid Panel; Future  -     TSH; Future      PLAN:  Going to [place] back to [facility] with his family in the summer  Continue to monitor BP at home   Advised to continue diet and exercise program, moderate intensity, minimum 150 minutes weekly. Return in about 6 months (around 2023) for AWV, Labs. Subjective   SUBJECTIVE/OBJECTIVE:  Patient here for 6-month follow-up and labs. Offers no complaints at the present time. Is exercising on a daily basis. Recently moved to [place]. Blood pressures been under good control at home. Review of Systems   Constitutional:  Negative for chills, fatigue and fever. HENT:  Negative for congestion, ear discharge, ear pain, facial swelling, hearing loss, nosebleeds, rhinorrhea, sinus pressure and sore throat. Eyes:  Negative for photophobia, pain, discharge, itching and visual disturbance. Respiratory:  Negative for cough, shortness of breath and wheezing. Cardiovascular:  Negative for chest pain, palpitations and leg swelling. Gastrointestinal:  Negative for abdominal distention, abdominal pain, blood in stool, constipation, diarrhea, nausea and vomiting. Endocrine: Negative for polydipsia, polyphagia and polyuria. Genitourinary:  Negative for difficulty urinating, dysuria, frequency, hematuria and urgency.    Musculoskeletal:  Negative for arthralgias, back pain, joint swelling and myalgias. Skin:  Negative for color change and rash. Allergic/Immunologic: Negative for environmental allergies and food allergies. Neurological:  Negative for dizziness, seizures, syncope, weakness, numbness and headaches. Psychiatric/Behavioral:  Negative for confusion, hallucinations and suicidal ideas. The patient is not nervous/anxious. Immunization History   Administered Date(s) Administered    COVID-19, PFIZER Bivalent BOOSTER, (age 12y+), IM, 30 mcg/0.3 mL dose 10/14/2022    COVID-19, PFIZER PURPLE top, DILUTE for use, (age 15 y+), 30mcg/0.3mL 02/26/2021, 03/19/2021, 10/03/2021    Hepatitis B Adult (Engerix-B) 04/06/1995, 05/18/1995, 11/15/1995    Influenza Virus Vaccine 09/14/2021    Influenza, FLUARIX, FLULAVAL, Ilean McLeod (age 10 mo+) AND AFLURIA, (age 1 y+), PF, 0.5mL 10/14/2022    Influenza, FLUCELVAX, (age 10 mo+), MDCK, PF, 0.5mL 09/27/2019, 09/28/2020    Influenza, Triv, 3 Years and older, IM (Afluria (5 yrs and older) 10/03/2021    Td vaccine (adult) 05/17/2013    Tdap (Boostrix, Adacel) 05/07/2013    Zoster Recombinant (Shingrix) 09/28/2020, 12/18/2020        Objective   /62 (Position: Sitting)   Pulse 75   Temp 97.8 °F (36.6 °C) (Temporal)   Ht 6' (1.829 m)   Wt 254 lb (115.2 kg)   SpO2 99%   BMI 34.45 kg/m²   Current Outpatient Medications   Medication Sig Dispense Refill    metroNIDAZOLE (METROCREAM) 0.75 % cream APPLY TOPICALLY TO FACE TWICE DAILY      valsartan-hydroCHLOROthiazide (DIOVAN-HCT) 160-25 MG per tablet Take 1 tablet by mouth daily 90 tablet 1     No current facility-administered medications for this visit. Physical Exam  Vitals and nursing note reviewed. Constitutional:       General: He is not in acute distress. Appearance: Normal appearance. HENT:      Head: Normocephalic and atraumatic. Nose: Nose normal.      Mouth/Throat:      Mouth: Mucous membranes are moist.      Pharynx: No oropharyngeal exudate or posterior oropharyngeal erythema. Eyes:      General: No scleral icterus. Conjunctiva/sclera: Conjunctivae normal.   Neck:      Comments: Trachea midline. No JVD. No thyromegaly or nodules. Cardiovascular:      Rate and Rhythm: Normal rate and regular rhythm. Pulses: Normal pulses. Heart sounds: Normal heart sounds. No murmur heard. Pulmonary:      Effort: Pulmonary effort is normal. No respiratory distress. Breath sounds: Normal breath sounds. No wheezing, rhonchi or rales. Musculoskeletal:      Cervical back: Neck supple. Right lower leg: No edema. Left lower leg: No edema. Lymphadenopathy:      Cervical: No cervical adenopathy. Skin:     General: Skin is warm and dry. Neurological:      General: No focal deficit present. Mental Status: He is alert and oriented to person, place, and time. Cranial Nerves: No cranial nerve deficit. Psychiatric:         Mood and Affect: Mood normal.         Behavior: Behavior normal.         Thought Content:  Thought content normal.         Judgment: Judgment normal.          CBC  WBC   Date Value Ref Range Status   05/31/2022 7.3 4.5 - 11.5 E9/L Final     RBC   Date Value Ref Range Status   05/31/2022 4.79 3.80 - 5.80 E12/L Final     Hemoglobin   Date Value Ref Range Status   05/31/2022 13.5 12.5 - 16.5 g/dL Final     Hematocrit   Date Value Ref Range Status   05/31/2022 43.4 37.0 - 54.0 % Final     MCV   Date Value Ref Range Status   05/31/2022 90.6 80.0 - 99.9 fL Final     MCH   Date Value Ref Range Status   05/31/2022 28.2 26.0 - 35.0 pg Final     MCHC   Date Value Ref Range Status   05/31/2022 31.1 (L) 32.0 - 34.5 % Final     RDW   Date Value Ref Range Status   05/31/2022 15.0 11.5 - 15.0 fL Final     Platelets   Date Value Ref Range Status   05/31/2022 224 130 - 450 E9/L Final     MPV   Date Value Ref Range Status   05/31/2022 12.1 (H) 7.0 - 12.0 fL Final       CMP  Sodium   Date Value Ref Range Status   05/31/2022 142 132 - 146 mmol/L Final     Potassium Date Value Ref Range Status   05/31/2022 4.9 3.5 - 5.0 mmol/L Final     Chloride   Date Value Ref Range Status   05/31/2022 104 98 - 107 mmol/L Final     CO2   Date Value Ref Range Status   05/31/2022 22 22 - 29 mmol/L Final     Anion Gap   Date Value Ref Range Status   05/31/2022 16 7 - 16 mmol/L Final     Glucose   Date Value Ref Range Status   09/07/2021 77 74 - 99 mg/dL Final   10/11/2011 94 70 - 110 mg/dL Final     BUN   Date Value Ref Range Status   05/31/2022 13 6 - 20 mg/dL Final     Creatinine   Date Value Ref Range Status   05/31/2022 0.9 0.7 - 1.2 mg/dL Final     GFR Non-   Date Value Ref Range Status   05/31/2022 >60 >=60 mL/min/1.73 Final     Comment:     Chronic Kidney Disease: less than 60 ml/min/1.73 sq.m. Kidney Failure: less than 15 ml/min/1.73 sq.m. Results valid for patients 18 years and older. GFR    Date Value Ref Range Status   05/31/2022 >60  Final     Calcium   Date Value Ref Range Status   05/31/2022 9.7 8.6 - 10.2 mg/dL Final     Total Protein   Date Value Ref Range Status   05/31/2022 7.7 6.4 - 8.3 g/dL Final     Albumin   Date Value Ref Range Status   05/31/2022 4.4 3.5 - 5.2 g/dL Final   10/11/2011 4.4 3.2 - 4.8 g/dL Final     Total Bilirubin   Date Value Ref Range Status   05/31/2022 0.6 0.0 - 1.2 mg/dL Final     Alkaline Phosphatase   Date Value Ref Range Status   05/31/2022 78 40 - 129 U/L Final     ALT   Date Value Ref Range Status   05/31/2022 18 0 - 40 U/L Final     AST   Date Value Ref Range Status   05/31/2022 32 0 - 39 U/L Final       TSH  Lab Results   Component Value Date    TSH 2.670 09/07/2021       A1C  No results found for: LABA1C    LIPID  Lab Results   Component Value Date    CHOL 176 05/31/2022    TRIG 62 05/31/2022    HDL 47 05/31/2022    LDLCALC 117 (H) 05/31/2022    LABVLDL 12 05/31/2022        PSA  Lab Results   Component Value Date    PSA 1.88 05/31/2022       No results found for this visit on 12/06/22.     An electronic signature was used to authenticate this note.     --Bartolo Lyle, DO

## 2022-12-07 LAB
ALBUMIN SERPL-MCNC: 4.4 G/DL (ref 3.5–5.2)
ALP BLD-CCNC: 74 U/L (ref 40–129)
ALT SERPL-CCNC: 16 U/L (ref 0–40)
ANION GAP SERPL CALCULATED.3IONS-SCNC: 12 MMOL/L (ref 7–16)
AST SERPL-CCNC: 31 U/L (ref 0–39)
BILIRUB SERPL-MCNC: 0.5 MG/DL (ref 0–1.2)
BUN BLDV-MCNC: 14 MG/DL (ref 6–20)
CALCIUM SERPL-MCNC: 10.1 MG/DL (ref 8.6–10.2)
CHLORIDE BLD-SCNC: 104 MMOL/L (ref 98–107)
CHOLESTEROL, TOTAL: 186 MG/DL (ref 0–199)
CO2: 27 MMOL/L (ref 22–29)
CREAT SERPL-MCNC: 0.9 MG/DL (ref 0.7–1.2)
GFR SERPL CREATININE-BSD FRML MDRD: >60 ML/MIN/1.73
GLUCOSE FASTING: 90 MG/DL (ref 74–99)
HDLC SERPL-MCNC: 46 MG/DL
LDL CHOLESTEROL CALCULATED: 129 MG/DL (ref 0–99)
POTASSIUM SERPL-SCNC: 5 MMOL/L (ref 3.5–5)
SODIUM BLD-SCNC: 143 MMOL/L (ref 132–146)
TOTAL PROTEIN: 7.7 G/DL (ref 6.4–8.3)
TRIGL SERPL-MCNC: 57 MG/DL (ref 0–149)
TSH SERPL DL<=0.05 MIU/L-ACNC: 4.37 UIU/ML (ref 0.27–4.2)
VITAMIN D 25-HYDROXY: 28 NG/ML (ref 30–100)
VLDLC SERPL CALC-MCNC: 11 MG/DL

## 2022-12-15 PROBLEM — E03.8 SUBCLINICAL HYPOTHYROIDISM: Status: ACTIVE | Noted: 2022-12-15

## 2022-12-26 ENCOUNTER — HOSPITAL ENCOUNTER (EMERGENCY)
Age: 57
Discharge: HOME | End: 2022-12-26
Payer: COMMERCIAL

## 2022-12-26 VITALS
TEMPERATURE: 97.9 F | SYSTOLIC BLOOD PRESSURE: 149 MMHG | HEART RATE: 75 BPM | OXYGEN SATURATION: 99 % | RESPIRATION RATE: 18 BRPM | DIASTOLIC BLOOD PRESSURE: 88 MMHG

## 2022-12-26 DIAGNOSIS — S61.011A LACERATION OF RIGHT THUMB WITHOUT FOREIGN BODY WITHOUT DAMAGE TO NAIL, INITIAL ENCOUNTER: Primary | ICD-10-CM

## 2022-12-26 PROCEDURE — 90715 TDAP VACCINE 7 YRS/> IM: CPT | Performed by: NURSE PRACTITIONER

## 2022-12-26 PROCEDURE — 6360000002 HC RX W HCPCS: Performed by: NURSE PRACTITIONER

## 2022-12-26 PROCEDURE — 99212 OFFICE O/P EST SF 10 MIN: CPT

## 2022-12-26 PROCEDURE — 90471 IMMUNIZATION ADMIN: CPT | Performed by: NURSE PRACTITIONER

## 2022-12-26 PROCEDURE — 6370000000 HC RX 637 (ALT 250 FOR IP): Performed by: NURSE PRACTITIONER

## 2022-12-26 RX ORDER — ACETAMINOPHEN 325 MG/1
650 TABLET ORAL ONCE
Status: COMPLETED | OUTPATIENT
Start: 2022-12-26 | End: 2022-12-26

## 2022-12-26 RX ORDER — HYDROCODONE BITARTRATE AND ACETAMINOPHEN 5; 325 MG/1; MG/1
1 TABLET ORAL EVERY 6 HOURS PRN
Qty: 8 TABLET | Refills: 0 | Status: SHIPPED | OUTPATIENT
Start: 2022-12-26 | End: 2022-12-30

## 2022-12-26 RX ADMIN — ACETAMINOPHEN 650 MG: 325 TABLET ORAL at 20:26

## 2022-12-26 RX ADMIN — TETANUS TOXOID, REDUCED DIPHTHERIA TOXOID AND ACELLULAR PERTUSSIS VACCINE, ADSORBED 0.5 ML: 5; 2.5; 8; 8; 2.5 SUSPENSION INTRAMUSCULAR at 20:40

## 2022-12-26 RX ADMIN — Medication 1 EACH: at 20:27

## 2022-12-26 ASSESSMENT — PAIN - FUNCTIONAL ASSESSMENT: PAIN_FUNCTIONAL_ASSESSMENT: 0-10

## 2022-12-27 NOTE — ED PROVIDER NOTES
Department of Emergency Medicine  [address] [place] [address]  Provider Note  Admit Date/Time: 2022  7:45 PM  Room:   NAME: Amari Acosta : 1965  MRN: [id number]     Chief Complaint:  Laceration (Of right thumb cut it on a mandolin  )    History of Present Illness        Amari Acosta is a 56 y.o. male who has a past medical history of:   Past Medical History:   Diagnosis Date    Dyslipidemia     Encounter for screening colonoscopy     History of pericarditis 2022    History of ventricular tachycardia 2022    Hypertension     Pericarditis     Primary hypertension 2022    Seasonal allergic rhinitis 2022    Subclinical hypothyroidism 12/15/2022    V-tach     Vasculitis ([address]     Vitamin D deficiency     presents to the urgent care center by private car for evaluation of an injury  to his right thumb. He cut it on a mandolin about 2 hours ago. He has no other injuries or complaints he said this is very painful. ROS    Pertinent positives and negatives are stated within HPI, all other systems reviewed and are negative. Past Surgical History:   Procedure Laterality Date    COLONOSCOPY N/A 2018    COLONOSCOPY WITH BIOPSY performed by Savage Perla DO at [address]  2021    COLONOSCOPY WITH BIOPSY performed by Savage Perla DO at [address] Follow-up 3 years. NECK SURGERY      mass exc. years ago. Social History:  reports that he has been smoking cigars and cigarettes. He has a 2.50 pack-year smoking history. He uses smokeless tobacco. He reports current alcohol use. He reports that he does not use drugs. Family History: family history includes Diabetes in his mother; High Blood Pressure in his mother; Prostate Cancer in his father and paternal grandfather.   Allergies: Demerol hcl [meperidine]    Physical Exam   Oxygen Saturation Interpretation: Normal.   ED Triage Vitals [22]   BP Temp Temp src Heart Rate Resp SpO2 Height Weight   (!) 149/88 97.9 °F (36.6 °C) -- 75 18 99 % -- --       Physical Exam  Constitutional/General: Alert and oriented x3, well appearing  HEENT:  NC/NT. Neck: Supple, full ROM,                 Respiratory: Not in respiratory distress  CV:  Regular rate. Regular rhythm. Musculoskeletal: Moves all extremities x 4. Integument: skin warm and dry. No rashes. He has a skin avulsion of the right lateral aspect of the tip of the right thumb. There is no nail involvement is superficial there is a small amount of bleeding when the bandage was removed. Lymphatic: no lymphadenopathy noted  Neurologic: GCS 15, no focal deficits, symmetric strength 5/5 in the upper and lower extremities bilaterally  Psychiatric: Normal Affect    Lab / Imaging Results   (All laboratory and radiology results have been personally reviewed by myself)  Labs:  No results found for this visit on 12/26/22. Imaging: All Radiology results interpreted by Radiologist unless otherwise noted. No orders to display       ED Course / Medical Decision Making     Medications   acetaminophen (TYLENOL) tablet 650 mg (has no administration in time range)   gelatin adsorbable (GELFOAM) sponge 1 each (has no administration in time range)   Tetanus-Diphth-Acell Pertussis (BOOSTRIX) injection 0.5 mL (has no administration in time range)          Consult(s):   None      MDM:   Is a skin avulsion from the mandolin cut this cannot be sutured because the skin is just missing it is does have a small amount of active bleeding when the dressing was removed. The area was irrigated with normal saline. Gelfoam and a dressing were applied to the wound. I advised him just to leave that on for at least 3 days before he evaluates it ---if it bleeds through that dressing he was advised to go to the emergency department he was given a tetanus booster.   He was given Tylenol now for pain he said it is very painful so I did order him 6 Norco to take at home if it continues to be painful. If he has any further problems he should follow-up with his doctor or get reevaluated    Plan of Care/Counseling:  I reviewed today's visit with the patient in addition to providing specific details for the plan of care and counseling regarding the diagnosis and prognosis. Questions are answered at this time and are agreeable with the plan. Assessment      1. Laceration of right thumb without foreign body without damage to nail, initial encounter      Plan   Discharge to home and advised to contact DO Carmen Merrill [facility] [address]  [phone]    Schedule an appointment as soon as possible for a visit   For wound re-check   Patient condition is good    New Medications     New Prescriptions    HYDROCODONE-ACETAMINOPHEN (NORCO) 5-325 MG PER TABLET    Take 1 tablet by mouth every 6 hours as needed for Pain for up to 8 doses. Max Daily Amount: 4 tablets     Electronically signed by VANESSA Young CNP   DD: 12/26/22  **This report was transcribed using voice recognition software. Every effort was made to ensure accuracy; however, inadvertent computerized transcription errors may be present.   END OF ED PROVIDER NOTE      VANESSA Young CNP  12/28/22 [id number]

## 2023-06-13 DIAGNOSIS — Z00.00 ENCOUNTER FOR WELLNESS EXAMINATION IN ADULT: ICD-10-CM

## 2023-06-13 DIAGNOSIS — E03.8 SUBCLINICAL HYPOTHYROIDISM: ICD-10-CM

## 2023-06-13 DIAGNOSIS — Z12.5 PROSTATE CANCER SCREENING: ICD-10-CM

## 2023-06-13 DIAGNOSIS — I10 PRIMARY HYPERTENSION: ICD-10-CM

## 2023-06-13 DIAGNOSIS — E55.9 VITAMIN D DEFICIENCY: ICD-10-CM

## 2023-06-13 DIAGNOSIS — E78.5 DYSLIPIDEMIA: ICD-10-CM

## 2023-06-13 LAB
ERYTHROCYTE [DISTWIDTH] IN BLOOD BY AUTOMATED COUNT: 14.1 FL (ref 11.5–15)
HCT VFR BLD AUTO: 45.7 % (ref 37–54)
HGB BLD-MCNC: 14.4 G/DL (ref 12.5–16.5)
MCH RBC QN AUTO: 28.8 PG (ref 26–35)
MCHC RBC AUTO-ENTMCNC: 31.5 % (ref 32–34.5)
MCV RBC AUTO: 91.4 FL (ref 80–99.9)
PLATELET # BLD AUTO: 203 E9/L (ref 130–450)
PMV BLD AUTO: 11.9 FL (ref 7–12)
PSA SERPL-MCNC: 2.21 NG/ML (ref 0–4)
RBC # BLD AUTO: 5 E12/L (ref 3.8–5.8)
WBC # BLD: 6.2 E9/L (ref 4.5–11.5)

## 2023-06-14 LAB
ALBUMIN SERPL-MCNC: 4.6 G/DL (ref 3.5–5.2)
ALP SERPL-CCNC: 80 U/L (ref 40–129)
ALT SERPL-CCNC: 22 U/L (ref 0–40)
ANION GAP SERPL CALCULATED.3IONS-SCNC: 24 MMOL/L (ref 7–16)
AST SERPL-CCNC: 33 U/L (ref 0–39)
BILIRUB SERPL-MCNC: 0.7 MG/DL (ref 0–1.2)
BUN SERPL-MCNC: 20 MG/DL (ref 6–20)
CALCIUM SERPL-MCNC: 9.9 MG/DL (ref 8.6–10.2)
CHLORIDE SERPL-SCNC: 105 MMOL/L (ref 98–107)
CHOLESTEROL, TOTAL: 197 MG/DL (ref 0–199)
CO2 SERPL-SCNC: 17 MMOL/L (ref 22–29)
CREAT SERPL-MCNC: 0.9 MG/DL (ref 0.7–1.2)
GLUCOSE FASTING: 93 MG/DL (ref 74–99)
HDLC SERPL-MCNC: 48 MG/DL
LDLC SERPL CALC-MCNC: 134 MG/DL (ref 0–99)
POTASSIUM SERPL-SCNC: 4.8 MMOL/L (ref 3.5–5)
PROT SERPL-MCNC: 8.1 G/DL (ref 6.4–8.3)
SODIUM SERPL-SCNC: 146 MMOL/L (ref 132–146)
TRIGL SERPL-MCNC: 73 MG/DL (ref 0–149)
TSH SERPL-MCNC: 4.64 UIU/ML (ref 0.27–4.2)
VLDLC SERPL CALC-MCNC: 15 MG/DL

## 2023-08-16 RX ORDER — VALSARTAN AND HYDROCHLOROTHIAZIDE 160; 25 MG/1; MG/1
TABLET ORAL
Qty: 90 TABLET | Refills: 1 | Status: SHIPPED | OUTPATIENT
Start: 2023-08-16

## 2023-08-16 NOTE — TELEPHONE ENCOUNTER
Requested Prescriptions     Pending Prescriptions Disp Refills    valsartan-hydroCHLOROthiazide (DIOVAN-HCT) 160-25 MG per tablet [Pharmacy Med Name: VALSARTAN-HCTZ 160-25 MG TAB] 90 tablet 1     Sig: TAKE 1 TABLET BY MOUTH EVERY DAY       Next appt is 12/14/2023  Last appt was 6/13/2023

## 2024-01-09 ENCOUNTER — TELEPHONE (OUTPATIENT)
Dept: PRIMARY CARE CLINIC | Age: 59
End: 2024-01-09

## 2024-01-09 RX ORDER — BENZONATATE 200 MG/1
200 CAPSULE ORAL 3 TIMES DAILY PRN
Qty: 20 CAPSULE | Refills: 0 | Status: SHIPPED | OUTPATIENT
Start: 2024-01-09 | End: 2024-01-19

## 2024-01-09 NOTE — TELEPHONE ENCOUNTER
Pt complains of having cough and congestion since Salvatore. Drainage is clear in color. Pt has been taking  Mucinex and Delsym. Pt would like to know if there is anything else you recommend. [facility]

## 2024-01-29 ASSESSMENT — PATIENT HEALTH QUESTIONNAIRE - PHQ9
SUM OF ALL RESPONSES TO PHQ9 QUESTIONS 1 & 2: 0
2. FEELING DOWN, DEPRESSED OR HOPELESS: NOT AT ALL
1. LITTLE INTEREST OR PLEASURE IN DOING THINGS: NOT AT ALL
SUM OF ALL RESPONSES TO PHQ QUESTIONS 1-9: 0
2. FEELING DOWN, DEPRESSED OR HOPELESS: 0
SUM OF ALL RESPONSES TO PHQ9 QUESTIONS 1 & 2: 0
1. LITTLE INTEREST OR PLEASURE IN DOING THINGS: 0
SUM OF ALL RESPONSES TO PHQ QUESTIONS 1-9: 0

## 2024-02-01 ENCOUNTER — OFFICE VISIT (OUTPATIENT)
Dept: PRIMARY CARE CLINIC | Age: 59
End: 2024-02-01
Payer: COMMERCIAL

## 2024-02-01 ENCOUNTER — TELEPHONE (OUTPATIENT)
Dept: PRIMARY CARE CLINIC | Age: 59
End: 2024-02-01

## 2024-02-01 VITALS
TEMPERATURE: 98.6 F | WEIGHT: 256 LBS | BODY MASS INDEX: 34.67 KG/M2 | HEIGHT: 72 IN | DIASTOLIC BLOOD PRESSURE: 80 MMHG | SYSTOLIC BLOOD PRESSURE: 138 MMHG | HEART RATE: 68 BPM | OXYGEN SATURATION: 99 %

## 2024-02-01 DIAGNOSIS — E78.5 DYSLIPIDEMIA: ICD-10-CM

## 2024-02-01 DIAGNOSIS — I10 PRIMARY HYPERTENSION: ICD-10-CM

## 2024-02-01 DIAGNOSIS — E03.8 SUBCLINICAL HYPOTHYROIDISM: ICD-10-CM

## 2024-02-01 DIAGNOSIS — E55.9 VITAMIN D DEFICIENCY: ICD-10-CM

## 2024-02-01 DIAGNOSIS — E78.5 DYSLIPIDEMIA: Primary | ICD-10-CM

## 2024-02-01 LAB
ALBUMIN SERPL-MCNC: 4.4 G/DL (ref 3.5–5.2)
ALP BLD-CCNC: 76 U/L (ref 40–129)
ALT SERPL-CCNC: 20 U/L (ref 0–40)
ANION GAP SERPL CALCULATED.3IONS-SCNC: 12 MMOL/L (ref 7–16)
AST SERPL-CCNC: 27 U/L (ref 0–39)
BILIRUB SERPL-MCNC: 0.5 MG/DL (ref 0–1.2)
BUN BLDV-MCNC: 17 MG/DL (ref 6–20)
CALCIUM SERPL-MCNC: 9.6 MG/DL (ref 8.6–10.2)
CHLORIDE BLD-SCNC: 101 MMOL/L (ref 98–107)
CHOLESTEROL: 185 MG/DL
CO2: 24 MMOL/L (ref 22–29)
CREAT SERPL-MCNC: 0.9 MG/DL (ref 0.7–1.2)
GFR SERPL CREATININE-BSD FRML MDRD: >60 ML/MIN/1.73M2
GLUCOSE FASTING: 93 MG/DL (ref 74–99)
HCT VFR BLD CALC: 42.8 % (ref 37–54)
HDLC SERPL-MCNC: 38 MG/DL
HEMOGLOBIN: 14.1 G/DL (ref 12.5–16.5)
LDL CHOLESTEROL: 134 MG/DL
MCH RBC QN AUTO: 28.8 PG (ref 26–35)
MCHC RBC AUTO-ENTMCNC: 32.9 G/DL (ref 32–34.5)
MCV RBC AUTO: 87.3 FL (ref 80–99.9)
PDW BLD-RTO: 13.7 % (ref 11.5–15)
PLATELET # BLD: 228 K/UL (ref 130–450)
PMV BLD AUTO: 11.4 FL (ref 7–12)
POTASSIUM SERPL-SCNC: 4.6 MMOL/L (ref 3.5–5)
RBC # BLD: 4.9 M/UL (ref 3.8–5.8)
SODIUM BLD-SCNC: 137 MMOL/L (ref 132–146)
TOTAL PROTEIN: 7.8 G/DL (ref 6.4–8.3)
TRIGL SERPL-MCNC: 63 MG/DL
TSH SERPL DL<=0.05 MIU/L-ACNC: 2.92 UIU/ML (ref 0.27–4.2)
VITAMIN D 25-HYDROXY: 25.5 NG/ML (ref 30–100)
VLDLC SERPL CALC-MCNC: 13 MG/DL
WBC # BLD: 6.1 K/UL (ref 4.5–11.5)

## 2024-02-01 PROCEDURE — 99214 OFFICE O/P EST MOD 30 MIN: CPT | Performed by: FAMILY MEDICINE

## 2024-02-01 PROCEDURE — 3079F DIAST BP 80-89 MM HG: CPT | Performed by: FAMILY MEDICINE

## 2024-02-01 PROCEDURE — 3075F SYST BP GE 130 - 139MM HG: CPT | Performed by: FAMILY MEDICINE

## 2024-02-02 RX ORDER — VALSARTAN AND HYDROCHLOROTHIAZIDE 160; 25 MG/1; MG/1
1 TABLET ORAL DAILY
Qty: 90 TABLET | Refills: 1 | Status: SHIPPED | OUTPATIENT
Start: 2024-02-02

## 2024-02-09 ASSESSMENT — ENCOUNTER SYMPTOMS
SORE THROAT: 0
COUGH: 0
VOMITING: 0
SHORTNESS OF BREATH: 0
ABDOMINAL DISTENTION: 0
SINUS PRESSURE: 0
BLOOD IN STOOL: 0
PHOTOPHOBIA: 0
WHEEZING: 0
COLOR CHANGE: 0
BACK PAIN: 0
CONSTIPATION: 0
RHINORRHEA: 0
FACIAL SWELLING: 0
EYE PAIN: 0
EYE ITCHING: 0
DIARRHEA: 0
NAUSEA: 0
EYE DISCHARGE: 0
ABDOMINAL PAIN: 0

## 2024-02-09 NOTE — PROGRESS NOTES
Venipuncture was obtained from left arm. Patient tolerated the procedure without complications or complaints.  
Ref Range Status   02/01/2024 137 132 - 146 mmol/L Final     Potassium   Date Value Ref Range Status   02/01/2024 4.6 3.5 - 5.0 mmol/L Final     Chloride   Date Value Ref Range Status   02/01/2024 101 98 - 107 mmol/L Final     CO2   Date Value Ref Range Status   02/01/2024 24 22 - 29 mmol/L Final     Anion Gap   Date Value Ref Range Status   02/01/2024 12 7 - 16 mmol/L Final     Glucose   Date Value Ref Range Status   09/07/2021 77 74 - 99 mg/dL Final   10/11/2011 94 70 - 110 mg/dL Final     BUN   Date Value Ref Range Status   02/01/2024 17 6 - 20 mg/dL Final     Creatinine   Date Value Ref Range Status   02/01/2024 0.9 0.70 - 1.20 mg/dL Final     Est, Glom Filt Rate   Date Value Ref Range Status   02/01/2024 >60 >60 mL/min/1.73m2 Final     Comment:       These results are not intended for use in patients <18 years of age.    eGFR results are calculated without a race factor using the 2021 CKD-EPI equation.  Careful clinical correlation is recommended, particularly when comparing to results   calculated using previous equations.  The CKD-EPI equation is less accurate in patients with extremes of muscle mass, extra-renal   metabolism of creatine, excessive creatine ingestion, or following therapy that affects   renal tubular secretion.       GFR    Date Value Ref Range Status   05/31/2022 >60  Final     Calcium   Date Value Ref Range Status   02/01/2024 9.6 8.6 - 10.2 mg/dL Final     Total Protein   Date Value Ref Range Status   02/01/2024 7.8 6.4 - 8.3 g/dL Final     Albumin   Date Value Ref Range Status   02/01/2024 4.4 3.5 - 5.2 g/dL Final   10/11/2011 4.4 3.2 - 4.8 g/dL Final     Total Bilirubin   Date Value Ref Range Status   02/01/2024 0.5 0.0 - 1.2 mg/dL Final     Alkaline Phosphatase   Date Value Ref Range Status   02/01/2024 76 40 - 129 U/L Final     ALT   Date Value Ref Range Status   02/01/2024 20 0 - 40 U/L Final     AST   Date Value Ref Range Status   02/01/2024 27 0 - 39 U/L Final

## 2024-04-22 ENCOUNTER — APPOINTMENT (OUTPATIENT)
Dept: PODIATRY | Facility: CLINIC | Age: 59
End: 2024-04-22
Payer: COMMERCIAL

## 2024-08-12 RX ORDER — VALSARTAN AND HYDROCHLOROTHIAZIDE 160; 25 MG/1; MG/1
1 TABLET ORAL DAILY
Qty: 90 TABLET | Refills: 1 | Status: SHIPPED | OUTPATIENT
Start: 2024-08-12

## 2024-09-12 ENCOUNTER — OFFICE VISIT (OUTPATIENT)
Dept: PRIMARY CARE CLINIC | Age: 59
End: 2024-09-12

## 2024-09-12 VITALS
SYSTOLIC BLOOD PRESSURE: 114 MMHG | TEMPERATURE: 98 F | BODY MASS INDEX: 35.08 KG/M2 | HEART RATE: 62 BPM | WEIGHT: 259 LBS | DIASTOLIC BLOOD PRESSURE: 74 MMHG | OXYGEN SATURATION: 99 % | HEIGHT: 72 IN

## 2024-09-12 DIAGNOSIS — E55.9 VITAMIN D DEFICIENCY: ICD-10-CM

## 2024-09-12 DIAGNOSIS — I10 PRIMARY HYPERTENSION: Primary | ICD-10-CM

## 2024-09-12 DIAGNOSIS — E03.8 SUBCLINICAL HYPOTHYROIDISM: ICD-10-CM

## 2024-09-12 DIAGNOSIS — Z12.5 SCREENING FOR PROSTATE CANCER: ICD-10-CM

## 2024-09-12 DIAGNOSIS — E78.5 DYSLIPIDEMIA: ICD-10-CM

## 2024-09-12 LAB
HCT VFR BLD CALC: 44.2 % (ref 37–54)
HEMOGLOBIN: 14 G/DL (ref 12.5–16.5)
MCH RBC QN AUTO: 28.3 PG (ref 26–35)
MCHC RBC AUTO-ENTMCNC: 31.7 G/DL (ref 32–34.5)
MCV RBC AUTO: 89.5 FL (ref 80–99.9)
PDW BLD-RTO: 14.2 % (ref 11.5–15)
PLATELET # BLD: 216 K/UL (ref 130–450)
PMV BLD AUTO: 12 FL (ref 7–12)
RBC # BLD: 4.94 M/UL (ref 3.8–5.8)
WBC # BLD: 5.8 K/UL (ref 4.5–11.5)

## 2024-09-12 SDOH — ECONOMIC STABILITY: FOOD INSECURITY: WITHIN THE PAST 12 MONTHS, YOU WORRIED THAT YOUR FOOD WOULD RUN OUT BEFORE YOU GOT MONEY TO BUY MORE.: NEVER TRUE

## 2024-09-12 SDOH — ECONOMIC STABILITY: FOOD INSECURITY: WITHIN THE PAST 12 MONTHS, THE FOOD YOU BOUGHT JUST DIDN'T LAST AND YOU DIDN'T HAVE MONEY TO GET MORE.: NEVER TRUE

## 2024-09-12 SDOH — ECONOMIC STABILITY: INCOME INSECURITY: HOW HARD IS IT FOR YOU TO PAY FOR THE VERY BASICS LIKE FOOD, HOUSING, MEDICAL CARE, AND HEATING?: NOT HARD AT ALL

## 2024-09-12 ASSESSMENT — ENCOUNTER SYMPTOMS
SINUS PRESSURE: 0
RHINORRHEA: 0
EYE ITCHING: 0
FACIAL SWELLING: 0
BACK PAIN: 0
ABDOMINAL DISTENTION: 0
COUGH: 0
COLOR CHANGE: 0
CONSTIPATION: 0
WHEEZING: 0
SHORTNESS OF BREATH: 0
PHOTOPHOBIA: 0
NAUSEA: 0
DIARRHEA: 0
EYE PAIN: 0
ABDOMINAL PAIN: 0
EYE DISCHARGE: 0
BLOOD IN STOOL: 0
VOMITING: 0
SORE THROAT: 0

## 2024-09-13 LAB
ALBUMIN: 4.5 G/DL (ref 3.5–5.2)
ALP BLD-CCNC: 82 U/L (ref 40–129)
ALT SERPL-CCNC: 18 U/L (ref 0–40)
ANION GAP SERPL CALCULATED.3IONS-SCNC: 15 MMOL/L (ref 7–16)
AST SERPL-CCNC: 33 U/L (ref 0–39)
BILIRUB SERPL-MCNC: 0.5 MG/DL (ref 0–1.2)
BUN BLDV-MCNC: 15 MG/DL (ref 6–20)
CALCIUM SERPL-MCNC: 9.6 MG/DL (ref 8.6–10.2)
CHLORIDE BLD-SCNC: 102 MMOL/L (ref 98–107)
CHOLESTEROL, TOTAL: 181 MG/DL
CO2: 23 MMOL/L (ref 22–29)
CREAT SERPL-MCNC: 0.8 MG/DL (ref 0.7–1.2)
GFR, ESTIMATED: >90 ML/MIN/1.73M2
GLUCOSE FASTING: 88 MG/DL (ref 74–99)
HDLC SERPL-MCNC: 40 MG/DL
LDL CHOLESTEROL: 126 MG/DL
POTASSIUM SERPL-SCNC: 4.9 MMOL/L (ref 3.5–5)
PROSTATE SPECIFIC ANTIGEN: 2.56 NG/ML (ref 0–4)
SODIUM BLD-SCNC: 140 MMOL/L (ref 132–146)
TOTAL PROTEIN: 7.6 G/DL (ref 6.4–8.3)
TRIGL SERPL-MCNC: 73 MG/DL
TSH SERPL DL<=0.05 MIU/L-ACNC: 4.05 UIU/ML (ref 0.27–4.2)
VITAMIN D 25-HYDROXY: 25.8 NG/ML (ref 30–100)
VLDLC SERPL CALC-MCNC: 15 MG/DL

## 2025-02-11 RX ORDER — VALSARTAN AND HYDROCHLOROTHIAZIDE 160; 25 MG/1; MG/1
1 TABLET ORAL DAILY
Qty: 90 TABLET | Refills: 1 | Status: SHIPPED | OUTPATIENT
Start: 2025-02-11

## 2025-02-11 NOTE — TELEPHONE ENCOUNTER
Requested Prescriptions     Pending Prescriptions Disp Refills    valsartan-hydroCHLOROthiazide (DIOVAN-HCT) 160-25 MG per tablet [Pharmacy Med Name: VALSARTAN-HCTZ 160-25 MG TAB] 90 tablet 1     Sig: TAKE 1 TABLET BY MOUTH EVERY DAY       Next appt is 4/1/2025  Last appt was 9/12/2024

## 2025-03-29 SDOH — ECONOMIC STABILITY: INCOME INSECURITY: IN THE LAST 12 MONTHS, WAS THERE A TIME WHEN YOU WERE NOT ABLE TO PAY THE MORTGAGE OR RENT ON TIME?: NO

## 2025-03-29 SDOH — ECONOMIC STABILITY: FOOD INSECURITY: WITHIN THE PAST 12 MONTHS, THE FOOD YOU BOUGHT JUST DIDN'T LAST AND YOU DIDN'T HAVE MONEY TO GET MORE.: NEVER TRUE

## 2025-03-29 SDOH — ECONOMIC STABILITY: FOOD INSECURITY: WITHIN THE PAST 12 MONTHS, YOU WORRIED THAT YOUR FOOD WOULD RUN OUT BEFORE YOU GOT MONEY TO BUY MORE.: NEVER TRUE

## 2025-03-29 SDOH — ECONOMIC STABILITY: TRANSPORTATION INSECURITY
IN THE PAST 12 MONTHS, HAS LACK OF TRANSPORTATION KEPT YOU FROM MEETINGS, WORK, OR FROM GETTING THINGS NEEDED FOR DAILY LIVING?: NO

## 2025-03-29 SDOH — ECONOMIC STABILITY: TRANSPORTATION INSECURITY
IN THE PAST 12 MONTHS, HAS THE LACK OF TRANSPORTATION KEPT YOU FROM MEDICAL APPOINTMENTS OR FROM GETTING MEDICATIONS?: NO

## 2025-03-29 ASSESSMENT — PATIENT HEALTH QUESTIONNAIRE - PHQ9
SUM OF ALL RESPONSES TO PHQ QUESTIONS 1-9: 0
SUM OF ALL RESPONSES TO PHQ9 QUESTIONS 1 & 2: 0
2. FEELING DOWN, DEPRESSED OR HOPELESS: NOT AT ALL
1. LITTLE INTEREST OR PLEASURE IN DOING THINGS: NOT AT ALL
SUM OF ALL RESPONSES TO PHQ QUESTIONS 1-9: 0
SUM OF ALL RESPONSES TO PHQ QUESTIONS 1-9: 0
1. LITTLE INTEREST OR PLEASURE IN DOING THINGS: NOT AT ALL
2. FEELING DOWN, DEPRESSED OR HOPELESS: NOT AT ALL
SUM OF ALL RESPONSES TO PHQ QUESTIONS 1-9: 0

## 2025-04-01 ENCOUNTER — OFFICE VISIT (OUTPATIENT)
Dept: PRIMARY CARE CLINIC | Age: 60
End: 2025-04-01
Payer: COMMERCIAL

## 2025-04-01 VITALS
WEIGHT: 261 LBS | DIASTOLIC BLOOD PRESSURE: 72 MMHG | HEIGHT: 72 IN | TEMPERATURE: 97.9 F | HEART RATE: 64 BPM | OXYGEN SATURATION: 96 % | BODY MASS INDEX: 35.35 KG/M2 | SYSTOLIC BLOOD PRESSURE: 122 MMHG

## 2025-04-01 DIAGNOSIS — E78.5 DYSLIPIDEMIA: ICD-10-CM

## 2025-04-01 DIAGNOSIS — I10 PRIMARY HYPERTENSION: Primary | ICD-10-CM

## 2025-04-01 DIAGNOSIS — E03.8 SUBCLINICAL HYPOTHYROIDISM: ICD-10-CM

## 2025-04-01 LAB
ALBUMIN: 4.4 G/DL (ref 3.5–5.2)
ALP BLD-CCNC: 83 U/L (ref 40–129)
ALT SERPL-CCNC: 16 U/L (ref 0–40)
ANION GAP SERPL CALCULATED.3IONS-SCNC: 16 MMOL/L (ref 7–16)
AST SERPL-CCNC: 27 U/L (ref 0–39)
BILIRUB SERPL-MCNC: 0.4 MG/DL (ref 0–1.2)
BUN BLDV-MCNC: 13 MG/DL (ref 6–20)
CALCIUM SERPL-MCNC: 9.5 MG/DL (ref 8.6–10.2)
CHLORIDE BLD-SCNC: 102 MMOL/L (ref 98–107)
CHOLESTEROL, TOTAL: 182 MG/DL
CO2: 22 MMOL/L (ref 22–29)
CREAT SERPL-MCNC: 0.8 MG/DL (ref 0.7–1.2)
GFR, ESTIMATED: >90 ML/MIN/1.73M2
GLUCOSE FASTING: 93 MG/DL (ref 74–99)
HCT VFR BLD CALC: 42.2 % (ref 37–54)
HDLC SERPL-MCNC: 41 MG/DL
HEMOGLOBIN: 14 G/DL (ref 12.5–16.5)
LDL CHOLESTEROL: 129 MG/DL
MCH RBC QN AUTO: 28.7 PG (ref 26–35)
MCHC RBC AUTO-ENTMCNC: 33.2 G/DL (ref 32–34.5)
MCV RBC AUTO: 86.7 FL (ref 80–99.9)
PDW BLD-RTO: 14 % (ref 11.5–15)
PLATELET # BLD: 234 K/UL (ref 130–450)
PMV BLD AUTO: 11.9 FL (ref 7–12)
POTASSIUM SERPL-SCNC: 4.5 MMOL/L (ref 3.5–5)
RBC # BLD: 4.87 M/UL (ref 3.8–5.8)
SODIUM BLD-SCNC: 140 MMOL/L (ref 132–146)
TOTAL PROTEIN: 7.5 G/DL (ref 6.4–8.3)
TRIGL SERPL-MCNC: 59 MG/DL
TSH SERPL DL<=0.05 MIU/L-ACNC: 3.31 UIU/ML (ref 0.27–4.2)
VLDLC SERPL CALC-MCNC: 12 MG/DL
WBC # BLD: 6 K/UL (ref 4.5–11.5)

## 2025-04-01 PROCEDURE — 3078F DIAST BP <80 MM HG: CPT | Performed by: FAMILY MEDICINE

## 2025-04-01 PROCEDURE — G2211 COMPLEX E/M VISIT ADD ON: HCPCS | Performed by: FAMILY MEDICINE

## 2025-04-01 PROCEDURE — 99214 OFFICE O/P EST MOD 30 MIN: CPT | Performed by: FAMILY MEDICINE

## 2025-04-01 PROCEDURE — 3074F SYST BP LT 130 MM HG: CPT | Performed by: FAMILY MEDICINE

## 2025-04-01 ASSESSMENT — ENCOUNTER SYMPTOMS
WHEEZING: 0
VOMITING: 0
NAUSEA: 0
COUGH: 0
ABDOMINAL DISTENTION: 0
EYE ITCHING: 0
RHINORRHEA: 0
DIARRHEA: 0
SORE THROAT: 0
CONSTIPATION: 0
PHOTOPHOBIA: 0
ABDOMINAL PAIN: 0
FACIAL SWELLING: 0
EYE DISCHARGE: 0
BACK PAIN: 0
COLOR CHANGE: 0
SINUS PRESSURE: 0
EYE PAIN: 0
BLOOD IN STOOL: 0
SHORTNESS OF BREATH: 0

## 2025-04-01 NOTE — PROGRESS NOTES
Colten Young Jr. (:  1965) is a 59 y.o. male,Established patient, here for evaluation of the following chief complaint(s):  6 Month Follow-Up (Labs)      Assessment & Plan   ASSESSMENT/PLAN:  1. Primary hypertension  -     CBC  -     Comprehensive Metabolic Panel, Fasting  -     Lipid Panel  2. Subclinical hypothyroidism  -     TSH  3. Dyslipidemia  -     Comprehensive Metabolic Panel, Fasting  -     Lipid Panel          PLAN:  Colonoscopy discussion with virtual visit [facility], Dr. Jolley 2024  Continue exercise program at [employer].    Labs ordered.    Return in about 6 months (around 10/1/2025) for Labs, Follow up.         Subjective   SUBJECTIVE/OBJECTIVE:  Patient here for 6-month follow-up and labs.  Offers no complaints at the present time.    He is currently exercising at [employer].  J Patient is due for a colonoscopy upcoming.  He had a virtual visit with GI from [facility], Dr. Jolley 2024.  He had a colonoscopy performed 2024 and will follow-up in 3-5 years.        Review of Systems   Constitutional:  Negative for chills, fatigue and fever.   HENT:  Negative for congestion, ear discharge, ear pain, facial swelling, hearing loss, nosebleeds, rhinorrhea, sinus pressure and sore throat.    Eyes:  Negative for photophobia, pain, discharge, itching and visual disturbance.   Respiratory:  Negative for cough, shortness of breath and wheezing.    Cardiovascular:  Negative for chest pain, palpitations and leg swelling.   Gastrointestinal:  Negative for abdominal distention, abdominal pain, blood in stool, constipation, diarrhea, nausea and vomiting.   Endocrine: Negative for polydipsia, polyphagia and polyuria.   Genitourinary:  Negative for difficulty urinating, dysuria, frequency, hematuria and urgency.   Musculoskeletal:  Negative for arthralgias, back pain, joint swelling and myalgias.   Skin:  Negative for color change and rash.   Allergic/Immunologic: Negative for environmental

## 2025-04-10 ENCOUNTER — RESULTS FOLLOW-UP (OUTPATIENT)
Dept: PRIMARY CARE CLINIC | Age: 60
End: 2025-04-10

## 2025-08-07 RX ORDER — VALSARTAN AND HYDROCHLOROTHIAZIDE 160; 25 MG/1; MG/1
1 TABLET ORAL DAILY
Qty: 90 TABLET | Refills: 1 | Status: SHIPPED | OUTPATIENT
Start: 2025-08-07

## (undated) DEVICE — FORCEPS BX L240CM JAW DIA2.4MM ORNG L CAP W/ NDL DISP RAD

## (undated) DEVICE — SPONGE GZ W4XL4IN RAYON POLY FILL CVR W/ NONWOVEN FAB

## (undated) DEVICE — GRADUATE TRIANG MEASURE 1000ML BLK PRNT